# Patient Record
Sex: MALE | Race: WHITE
[De-identification: names, ages, dates, MRNs, and addresses within clinical notes are randomized per-mention and may not be internally consistent; named-entity substitution may affect disease eponyms.]

---

## 2017-09-06 VITALS — SYSTOLIC BLOOD PRESSURE: 152 MMHG | DIASTOLIC BLOOD PRESSURE: 89 MMHG

## 2021-10-18 ENCOUNTER — HOSPITAL ENCOUNTER (OUTPATIENT)
Dept: HOSPITAL 63 - RAD | Age: 81
End: 2021-10-18
Attending: NURSE PRACTITIONER
Payer: MEDICARE

## 2021-10-18 DIAGNOSIS — S69.91XA: Primary | ICD-10-CM

## 2021-10-18 DIAGNOSIS — M19.031: ICD-10-CM

## 2021-10-18 DIAGNOSIS — S52.611A: ICD-10-CM
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
## 2021-10-18 DIAGNOSIS — M11.231: ICD-10-CM
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
## 2021-10-18 DIAGNOSIS — M79.89: ICD-10-CM
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
## 2021-10-18 PROCEDURE — 73110 X-RAY EXAM OF WRIST: CPT

## 2021-10-18 NOTE — RAD
XR RT WRIST 3VIEWS, XR HAND 3 VIEWS



History: Reason: FALL ON SATURDAY, SWELLING IN HAND AND WRIST PAIN ALONG WRIST / Spl. Instructions:  
/ History: 



Technique: 3 views right wrist and 3 views right hand



Comparison: None.



Findings:

Normal alignment of the wrist. No acute fracture. Degenerative changes of the radiocarpal joint with 
remodeling of the distal radius. Chondrocalcinosis is noted. Chronic ununited ulnar styloid fracture.
 Advanced triscaphe degenerative changes. Vascular calcifications.



Normal alignment of the hand. No dislocation. Advanced third carpometacarpal joint space narrowing wi
th irregularity and sclerosis. No acute fracture. Moderate distal phalangeal degenerative changes mos
t prominent within the second and third joints.



Impression: 

1.  No acute osseous abnormality.

2.  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
arpophalangeal joint. Findings can be seen with inflammatory arthropathy such as rheumatoid arthritis
.



Electronically signed by: Darryl Tang DO (10/18/2021 12:17 PM) [id number]

## 2021-10-18 NOTE — RAD
XR RT WRIST 3VIEWS, XR HAND 3 VIEWS



History: Reason: FALL ON SATURDAY, SWELLING IN HAND AND WRIST PAIN ALONG WRIST / Spl. Instructions:  
/ History: 



Technique: 3 views right wrist and 3 views right hand



Comparison: None.



Findings:

Normal alignment of the wrist. No acute fracture. Degenerative changes of the radiocarpal joint with 
remodeling of the distal radius. Chondrocalcinosis is noted. Chronic ununited ulnar styloid fracture.
 Advanced triscaphe degenerative changes. Vascular calcifications.



Normal alignment of the hand. No dislocation. Advanced third carpometacarpal joint space narrowing wi
th irregularity and sclerosis. No acute fracture. Moderate distal phalangeal degenerative changes mos
t prominent within the second and third joints.



Impression: 

1.  No acute osseous abnormality.

2.  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
arpophalangeal joint. Findings can be seen with inflammatory arthropathy such as rheumatoid arthritis
.



Electronically signed by: Darryl Tang DO (10/18/2021 12:17 PM) [id number]